# Patient Record
Sex: FEMALE | Race: WHITE
[De-identification: names, ages, dates, MRNs, and addresses within clinical notes are randomized per-mention and may not be internally consistent; named-entity substitution may affect disease eponyms.]

---

## 2017-05-04 ENCOUNTER — HOSPITAL ENCOUNTER (EMERGENCY)
Dept: HOSPITAL 5 - ED | Age: 37
LOS: 1 days | Discharge: HOME | End: 2017-05-05
Payer: SELF-PAY

## 2017-05-04 DIAGNOSIS — R07.9: ICD-10-CM

## 2017-05-04 DIAGNOSIS — F41.9: ICD-10-CM

## 2017-05-04 DIAGNOSIS — I10: Primary | ICD-10-CM

## 2017-05-04 LAB
ANION GAP SERPL CALC-SCNC: 17 MMOL/L
BASOPHILS NFR BLD AUTO: 0.2 % (ref 0–1.8)
BUN SERPL-MCNC: 8 MG/DL (ref 7–17)
BUN/CREAT SERPL: 8.88 %
CALCIUM SERPL-MCNC: 9.6 MG/DL (ref 8.4–10.2)
CHLORIDE SERPL-SCNC: 95.5 MMOL/L (ref 98–107)
CO2 SERPL-SCNC: 26 MMOL/L (ref 22–30)
EOSINOPHIL NFR BLD AUTO: 0.3 % (ref 0–4.3)
GLUCOSE SERPL-MCNC: 133 MG/DL (ref 65–100)
HCT VFR BLD CALC: 40.7 % (ref 30.3–42.9)
HGB BLD-MCNC: 13.6 GM/DL (ref 10.1–14.3)
MCH RBC QN AUTO: 33 PG (ref 28–32)
MCHC RBC AUTO-ENTMCNC: 33 % (ref 30–34)
MCV RBC AUTO: 98 FL (ref 79–97)
PLATELET # BLD: 349 K/MM3 (ref 140–440)
POTASSIUM SERPL-SCNC: 3.4 MMOL/L (ref 3.6–5)
RBC # BLD AUTO: 4.16 M/MM3 (ref 3.65–5.03)
SODIUM SERPL-SCNC: 135 MMOL/L (ref 137–145)
WBC # BLD AUTO: 12.4 K/MM3 (ref 4.5–11)

## 2017-05-04 PROCEDURE — 84443 ASSAY THYROID STIM HORMONE: CPT

## 2017-05-04 PROCEDURE — 85379 FIBRIN DEGRADATION QUANT: CPT

## 2017-05-04 PROCEDURE — 80048 BASIC METABOLIC PNL TOTAL CA: CPT

## 2017-05-04 PROCEDURE — 36415 COLL VENOUS BLD VENIPUNCTURE: CPT

## 2017-05-04 PROCEDURE — 99285 EMERGENCY DEPT VISIT HI MDM: CPT

## 2017-05-04 PROCEDURE — 93005 ELECTROCARDIOGRAM TRACING: CPT

## 2017-05-04 PROCEDURE — 71010: CPT

## 2017-05-04 PROCEDURE — 96361 HYDRATE IV INFUSION ADD-ON: CPT

## 2017-05-04 PROCEDURE — 85025 COMPLETE CBC W/AUTO DIFF WBC: CPT

## 2017-05-04 PROCEDURE — 84484 ASSAY OF TROPONIN QUANT: CPT

## 2017-05-04 PROCEDURE — 84703 CHORIONIC GONADOTROPIN ASSAY: CPT

## 2017-05-04 PROCEDURE — 93010 ELECTROCARDIOGRAM REPORT: CPT

## 2017-05-04 PROCEDURE — 96374 THER/PROPH/DIAG INJ IV PUSH: CPT

## 2017-05-04 NOTE — EMERGENCY DEPARTMENT REPORT
HPI





- General


Chief Complaint: Chest Pain


Time Seen by Provider: 05/04/17 22:39





- HPI


HPI: 


This is a 36-year-old  female presents to the emergency department by 

EMS from home with complaint of some midsternal chest pain and some shortness 

of breath that began earlier this morning.  The patient says that she has been 

having this problem over the past 3-4 weeks but normally it is just very small 

quick spells.  She denies any nausea, vomiting, fever, diaphoresis.  She does 

not have any past medical or surgical history.  She did not take anything for 

symptoms prior to presentation.  She did not receive anything in route by EMS.  

Recent travel or sick contacts at home.  She denies tobacco or drug abuse.  

Patient does have some anxiety and stress in her life right now as her mother 

is currently sick and in [place] with no one to take care of her and her 

grandmother is starting to deal with Alzheimer's dementia.








ED Past Medical Hx





- Past Medical History


Hx Hypertension: No


Hx Diabetes: No


Hx Deep Vein Thrombosis: No


Hx Renal Disease: No


Hx Sickle Cell Disease: No


Hx Seizures: No


Hx Asthma: No


Hx HIV: No





- Social History


Smoking Status: Never Smoker


Substance Use Type: None





ED Review of Systems


ROS: 


Stated complaint: ANXIETY/CHEST PAIN


Other details as noted in HPI





Comment: All other systems reviewed and negative


Constitutional: denies: chills, fever


Eyes: denies: eye pain, eye discharge, vision change


ENT: denies: ear pain, throat pain


Respiratory: shortness of breath.  denies: cough


Cardiovascular: chest pain.  denies: palpitations


Gastrointestinal: denies: abdominal pain, nausea, diarrhea


Genitourinary: denies: urgency, dysuria, discharge


Musculoskeletal: denies: back pain, joint swelling, arthralgia


Skin: denies: rash, lesions


Neurological: denies: headache, weakness, paresthesias


Psychiatric: anxiety.  denies: suicidal thoughts





Physical Exam





- Physical Exam


Vital Signs: 


 Vital Signs











  05/04/17 05/04/17 05/04/17





  16:58 22:29 22:31


 


Temperature 98.1 F 98.2 F 98.1 F


 


Pulse Rate 122 H 105 H 107 H


 


Respiratory 22 20 19





Rate   


 


Blood Pressure 154/111  152/111


 


Blood Pressure  152/111 





[Left]   


 


O2 Sat by Pulse 100 99 98





Oximetry   











Physical Exam: 


GENERAL: The patient is well-developed well-nourished.  Patient appears 

slightly anxious.


HEENT: Normocephalic.  Atraumatic.  Extraocular motions are intact.  Patient 

has moist mucous membranes.  Pupils equal reactive to light bilaterally.


NECK: Supple.  Trachea is midline.


CHEST/LUNGS: Clear to auscultation.  There is no respiratory distress noted.


HEART/CARDIOVASCULAR: Regular.  There is mild tachycardia.  There is no gallop 

rub or murmur.


ABDOMEN: Abdomen is soft, nontender.  Patient has normal bowel sounds.  There 

is no abdominal distention.


SKIN: Skin is warm and dry.


NEURO: The patient is awake, alert, and oriented.  The patient is cooperative.  

The patient has no focal neurologic deficits.  The patient has normal speech.


MUSCULOSKELETAL: There is no tenderness or deformity.  There is no limitation 

range of motion.  There is no evidence of acute injury.








ED Course


 Vital Signs











  05/04/17 05/04/17 05/04/17





  16:58 22:29 22:31


 


Temperature 98.1 F 98.2 F 98.1 F


 


Pulse Rate 122 H 105 H 107 H


 


Respiratory 22 20 19





Rate   


 


Blood Pressure 154/111  152/111


 


Blood Pressure  152/111 





[Left]   


 


O2 Sat by Pulse 100 99 98





Oximetry   














ED Medical Decision Making





- Lab Data


Result diagrams: 


 05/04/17 17:07





 05/04/17 17:07





- EKG Data


-: EKG Interpreted by Me


EKG shows normal: sinus rhythm, axis, intervals, QRS complexes (Q waves to the 

septal leads), ST-T waves


Rate: normal, tachycardia (121 bpm)





- EKG Data


When compared to previous EKG there are: previous EKG unavailable


Interpretation: other (sinus tachycardia, normal axis, Q waves to the septal 

leads but no ST elevation MI.)





- Radiology Data


Radiology results: image reviewed


interpreted by me: 


Chest x-ray did not show any acute process.  Heart is normal shape and size.  

No effusions.  No pneumothorax.  No signs of pneumonia seen.








- Medical Decision Making


36 year old female presents the emergency department with a complaint of some 

chest discomfort and occasional shortness of breath.  Patient presents with 

tachycardia and appears anxious.  EKG shows some sinus tachycardia but no signs 

of ST elevation MI or significant dysrhythmia.  Chest x-ray does not show any 

acute process.  Patient's labs have been mostly unremarkable with negative 

troponins 3, negative d-dimer, normal thyroid function and no signs of 

infection.  Patient was given some IV fluid and Toradol for discomfort.  Upon 

reevaluation the patient says she is feeling greatly improved and all of her 

symptoms have resolved.  Her vital signs were stable throughout ED course but 

the tachycardia resolved.  The patient has no past medical history and is low 

on the RON score.  She is low on the heart score.  She appears safe for 

discharge home at this time.  She was given referrals for primary care and will 

return to the ER with any worsening of her symptoms or any acute distress.








- Differential Diagnosis


MI, PE, hyperthyroidism, anxiety


Critical Care Time: No


Critical care attestation.: 


If time is entered above; I have spent that time in minutes in the direct care 

of this critically ill patient, excluding procedure time.








ED Disposition


Clinical Impression: 


 Anxiety





Chest pain


Qualifiers:


 Chest pain type: unspecified Qualified Code(s): R07.9 - Chest pain, unspecified





Hypertension


Qualifiers:


 Hypertension type: essential hypertension Qualified Code(s): I10 - Essential (

primary) hypertension





Disposition: DISCHARGED TO HOME OR SELFCARE


Is pt being admited?: No


Condition: Stable


Instructions:  Chest Pain (ED), Hypertension (ED)


Additional Instructions: 


Please follow-up with a primary care doctor as soon as possible.  Return to the 

emergency department with any worsening of your symptoms or any acute distress.


Referrals: 


PRIMARY CARE,MD [Primary Care Provider] - 3-5 Days


[facility] [Outside] - 3-5 Days


[facility] [Outside] - 3-5 Days


[facility] [Outside] - 3-5 Days


Forms:  Work/School Release Form(ED)


Time of Disposition: 00:45

## 2017-05-05 VITALS — SYSTOLIC BLOOD PRESSURE: 148 MMHG | DIASTOLIC BLOOD PRESSURE: 97 MMHG

## 2017-05-05 NOTE — XRAY REPORT
ROUTINE CHEST, TWO VIEWS:



Chest pain.

PA and lateral views demonstrate the heart and mediastinal

contour to be of normal size and shape.  The lungs are clear and fully 

expanded and the soft tissues and bony structures are normal.



IMPRESSION:

Normal study.

## 2020-11-12 ENCOUNTER — HOSPITAL ENCOUNTER (INPATIENT)
Dept: HOSPITAL 5 - LD | Age: 40
LOS: 4 days | Discharge: HOME | End: 2020-11-16
Attending: OBSTETRICS & GYNECOLOGY | Admitting: OBSTETRICS & GYNECOLOGY
Payer: COMMERCIAL

## 2020-11-12 DIAGNOSIS — Z20.828: ICD-10-CM

## 2020-11-12 DIAGNOSIS — Z12.4: ICD-10-CM

## 2020-11-12 DIAGNOSIS — Z3A.40: ICD-10-CM

## 2020-11-12 DIAGNOSIS — R03.0: ICD-10-CM

## 2020-11-12 LAB
ALBUMIN SERPL-MCNC: 3.2 G/DL (ref 3.9–5)
ALT SERPL-CCNC: 7 UNITS/L (ref 7–56)
BUN SERPL-MCNC: 6 MG/DL (ref 7–17)
BUN/CREAT SERPL: 10 %
CALCIUM SERPL-MCNC: 8.8 MG/DL (ref 8.4–10.2)
HCT VFR BLD CALC: 30.1 % (ref 30.3–42.9)
HEMOLYSIS INDEX: 4
HGB BLD-MCNC: 10.4 GM/DL (ref 10.1–14.3)
MCHC RBC AUTO-ENTMCNC: 35 % (ref 30–34)
MCV RBC AUTO: 96 FL (ref 79–97)
PLATELET # BLD: 281 K/MM3 (ref 140–440)
RBC # BLD AUTO: 3.12 M/MM3 (ref 3.65–5.03)
URATE SERPL-MCNC: 5.1 MG/DL (ref 3.5–7.6)

## 2020-11-12 PROCEDURE — 36415 COLL VENOUS BLD VENIPUNCTURE: CPT

## 2020-11-12 PROCEDURE — 85014 HEMATOCRIT: CPT

## 2020-11-12 PROCEDURE — 86850 RBC ANTIBODY SCREEN: CPT

## 2020-11-12 PROCEDURE — 87086 URINE CULTURE/COLONY COUNT: CPT

## 2020-11-12 PROCEDURE — 82962 GLUCOSE BLOOD TEST: CPT

## 2020-11-12 PROCEDURE — 80053 COMPREHEN METABOLIC PANEL: CPT

## 2020-11-12 PROCEDURE — 83615 LACTATE (LD) (LDH) ENZYME: CPT

## 2020-11-12 PROCEDURE — 86900 BLOOD TYPING SEROLOGIC ABO: CPT

## 2020-11-12 PROCEDURE — 85018 HEMOGLOBIN: CPT

## 2020-11-12 PROCEDURE — 85025 COMPLETE CBC W/AUTO DIFF WBC: CPT

## 2020-11-12 PROCEDURE — U0003 INFECTIOUS AGENT DETECTION BY NUCLEIC ACID (DNA OR RNA); SEVERE ACUTE RESPIRATORY SYNDROME CORONAVIRUS 2 (SARS-COV-2) (CORONAVIRUS DISEASE [COVID-19]), AMPLIFIED PROBE TECHNIQUE, MAKING USE OF HIGH THROUGHPUT TECHNOLOGIES AS DESCRIBED BY CMS-2020-01-R: HCPCS

## 2020-11-12 PROCEDURE — 84550 ASSAY OF BLOOD/URIC ACID: CPT

## 2020-11-12 PROCEDURE — 86901 BLOOD TYPING SEROLOGIC RH(D): CPT

## 2020-11-12 PROCEDURE — 81001 URINALYSIS AUTO W/SCOPE: CPT

## 2020-11-12 PROCEDURE — 85027 COMPLETE CBC AUTOMATED: CPT

## 2020-11-12 RX ADMIN — SODIUM CHLORIDE, SODIUM LACTATE, POTASSIUM CHLORIDE, AND CALCIUM CHLORIDE SCH MLS/HR: .6; .31; .03; .02 INJECTION, SOLUTION INTRAVENOUS at 11:10

## 2020-11-12 RX ADMIN — SODIUM CHLORIDE, SODIUM LACTATE, POTASSIUM CHLORIDE, AND CALCIUM CHLORIDE SCH MLS/HR: .6; .31; .03; .02 INJECTION, SOLUTION INTRAVENOUS at 17:58

## 2020-11-12 RX ADMIN — SODIUM CHLORIDE, SODIUM LACTATE, POTASSIUM CHLORIDE, AND CALCIUM CHLORIDE SCH MLS/HR: .6; .31; .03; .02 INJECTION, SOLUTION INTRAVENOUS at 23:43

## 2020-11-12 NOTE — HISTORY AND PHYSICAL REPORT
History of Present Illness


Date of examination: 20


Date of admission: 


20 07:21





Chief complaint: 





"I think my water broke"


History of present illness: 





39 y/o   female presents to [facility] @ 40.1 wks with SROM cl fluid @ 5am

today. She initiated her pnc @ [facility] at 26.6 wks. Her preg has been 

complicated by late care, suspected fetal microcephaly, and GDM (diet 

controlled). She is AMA, rubella NI, pap with LSIL and GBS neg. Pt was admitted 

to L&D for .





Past History


Past Medical History: no pertinent history


Past Surgical History: no surgical history


GYN History: abnormal PAP smear


Family/Genetic History: diabetes, other (ANXIETY)


Social history: no significant social history





- Obstetrical History


Expected Date of Delivery: 20


Actual Gestation: 40 Week(s) 1 Day(s) 


: 6


Para: 5


Hx # Term Pregnancies: 5


Number of  Pregnancies: 0


Spontaneous Abortions: 0


Induced : 0


Number of Living Children: 5





Medications and Allergies


                                    Allergies











Allergy/AdvReac Type Severity Reaction Status Date / Time


 


No Known Allergies Allergy   Verified 01/23/15 09:33











                                Home Medications











 Medication  Instructions  Recorded  Confirmed  Last Taken  Type


 


Prenatal Vitamin 1 tab PO DAILY 20 20:00 History














Review of Systems


All systems: negative


Eyes: deferred


Ears, nose, mouth and throat: deferred


Breasts: normal


Genitourinary: normal appearance


Rectal Exam: deferred





- Vital Signs


Vital signs: 


                                   Vital Signs











Pulse Pulse Ox


 


 86   97 


 


 20 07:34  20 07:34








                                        











Temp Pulse Resp BP Pulse Ox


 


 98.5 F   73   17   131/92   96 


 


 20 08:29  20 09:36  20 08:29  20 09:36  20 08:50














- Physical Exam


Breasts: Positive: normal


Abdomen: Positive: normal appearance, soft, other (GRAVID)


Genitourinary (Female): Positive: normal external genitalia, normal perenium


Vulva: both: normal


Vagina: Positive: normal moisture


Uterus: Positive: enlarged, other (GRAVID)


Adnexa: both: normal


Anus/Rectum: Positive: normal perianal skin


Extremities: Positive: normal





- Obstetrical


FHR: auscultation normal, category 1


Uterine Contraction Monitor Mode: External


Cervical Dilatation: 3 (PER NURSE)


Cervical Effacement Percentage: 50 (PER NURSE)


Fetal station: -3


Uterine Contraction Pattern: Irregular


Uterine Tone Measurement Phase: Resting


Uterine Contraction Intensity: Mild





Results


All other labs normal.








Assessment and Plan





A: IUP@ 40.1 wks with srom


    AMA,Rubella NI, GDM


    Suspected fetal microcephaly





p: Admit to L&D


    Start Pitocin per protocal


    FSBS AC and HS


    Continue monitoring


    Anticipate 


    Notify NICU of fetal status


    Offer Rubella vaccine pp





- Patient Problems


(1) Term pregnancy


Current Visit: Yes   Status: Acute   





(2) AMA (advanced maternal age) multigravida 35+


Current Visit: Yes   Status: Acute   





(3) Fetal microcephaly


Current Visit: Yes   Status: Acute   





(4) GDM (gestational diabetes mellitus)


Current Visit: Yes   Status: Acute   





(5) Abnormal Pap smear of cervix


Current Visit: Yes   Status: Acute

## 2020-11-12 NOTE — ANESTHESIA CONSULTATION
Anesthesia Consult and Med Hx


Date of service: 11/12/20





- Airway


Anesthetic Teeth Evaluation: Good


ROM Head & Neck: Adequate


Mental/Hyoid Distance: Adequate


Mallampati Class: Class II


Intubation Access Assessment: Probably Good





- Pulmonary Exam


CTA: Yes





- Cardiac Exam


Cardiac Exam: RRR





- Pre-Operative Health Status


ASA Pre-Surgery Classification: ASA3


Proposed Anesthetic Plan: Epidural





- Pulmonary


Hx Asthma: No


Hx Pneumonia: No





- Cardiovascular System


Hx Hypertension: No





- Central Nervous System


Hx Seizures: No


Hx Psychiatric Problems: No





- Endocrine


Hx Renal Disease: No


Hx End Stage Renal Disease: No


Hx Non-Insulin Dependent Diabetes: Yes


Hx Hypothyroidism: No


Hx Hyperthyroidism: No





- Hematic


Hx Anemia: No


Hx Sickle Cell Disease: No





- Other Systems


Hx Alcohol Use: No

## 2020-11-13 LAB
HCT VFR BLD CALC: 27.3 % (ref 30.3–42.9)
HGB BLD-MCNC: 9.5 GM/DL (ref 10.1–14.3)

## 2020-11-13 PROCEDURE — 3E0R3BZ INTRODUCTION OF ANESTHETIC AGENT INTO SPINAL CANAL, PERCUTANEOUS APPROACH: ICD-10-PCS | Performed by: OBSTETRICS & GYNECOLOGY

## 2020-11-13 PROCEDURE — 00HU33Z INSERTION OF INFUSION DEVICE INTO SPINAL CANAL, PERCUTANEOUS APPROACH: ICD-10-PCS | Performed by: OBSTETRICS & GYNECOLOGY

## 2020-11-13 PROCEDURE — 0HQ9XZZ REPAIR PERINEUM SKIN, EXTERNAL APPROACH: ICD-10-PCS | Performed by: OBSTETRICS & GYNECOLOGY

## 2020-11-13 PROCEDURE — 10H07YZ INSERTION OF OTHER DEVICE INTO PRODUCTS OF CONCEPTION, VIA NATURAL OR ARTIFICIAL OPENING: ICD-10-PCS | Performed by: OBSTETRICS & GYNECOLOGY

## 2020-11-13 RX ADMIN — IBUPROFEN SCH MG: 600 TABLET, FILM COATED ORAL at 17:45

## 2020-11-13 RX ADMIN — Medication SCH EACH: at 12:27

## 2020-11-13 RX ADMIN — IBUPROFEN SCH MG: 600 TABLET, FILM COATED ORAL at 03:25

## 2020-11-13 RX ADMIN — IBUPROFEN SCH MG: 600 TABLET, FILM COATED ORAL at 12:27

## 2020-11-13 NOTE — PROCEDURE NOTE
OB Delivery Note





- Vaginal


Delivery presentation: vertex


Delivery position: OA


Intrapartum events: mult.variable deceleratio


Delivery induction: none


Delivery augmentation: pitocin


Delivery monitor: external FHT, external uterine, internal FHT, internal uterine


Route of delivery: 


Delivery placenta: spontaneous


Delivery cord: 3 umbilical vessels


Episiotomy: none


Delivery laceration: 1st degree, other (perineal)


Delivery repair: vicryl


Anesthesia: local, epidural


Delivery comments: 





Called to  for delivery. SVE 10/100%/+1 and pushing begin.  of a viable 

live female infant in OA position. Spontaneous delivery of head and shoulders 

with terminal mec noted. Infant was immed placed on mother's chest for skin to 

skin bonding while nurse stimulated and dried baby. Delayed cord clamping x 90 

sec then cord was clamped x 2 and baby's sister was allowed to cut the cord. 

Afterwards, infant was given to awaiting NICU nurse for an asses. APGAR 8/9. 

Spontaneous delivery of an intact placenta with CVX 3. FF@ U1 with fundal 

massage and IV Pitocin. Exploration of tears revealed a 1st degree perineal lac 

which was repaired with a 3-0 vicryl on a CT-1. EBL 50 cc. Mom and baby stable. 

FW [id number] Gms.





- Infant


  ** A


Apgar at 1 minute: 8


Apgar at 5 minutes: 9


Infant Gender: Female (FW [id number])

## 2020-11-14 RX ADMIN — IBUPROFEN SCH MG: 600 TABLET, FILM COATED ORAL at 17:20

## 2020-11-14 RX ADMIN — Medication SCH: at 17:18

## 2020-11-14 RX ADMIN — IBUPROFEN SCH MG: 600 TABLET, FILM COATED ORAL at 01:36

## 2020-11-14 RX ADMIN — IBUPROFEN SCH: 600 TABLET, FILM COATED ORAL at 00:56

## 2020-11-14 RX ADMIN — IBUPROFEN SCH: 600 TABLET, FILM COATED ORAL at 12:00

## 2020-11-15 LAB
ALBUMIN SERPL-MCNC: 3.1 G/DL (ref 3.9–5)
ALT SERPL-CCNC: 10 UNITS/L (ref 7–56)
BACTERIA #/AREA URNS HPF: (no result) /HPF
BASOPHILS # (AUTO): 0 K/MM3 (ref 0–0.1)
BASOPHILS NFR BLD AUTO: 0.1 % (ref 0–1.8)
BILIRUB UR QL STRIP: (no result)
BLOOD UR QL VISUAL: (no result)
BUN SERPL-MCNC: 9 MG/DL (ref 7–17)
BUN/CREAT SERPL: 15 %
CALCIUM SERPL-MCNC: 9 MG/DL (ref 8.4–10.2)
EOSINOPHIL # BLD AUTO: 0.1 K/MM3 (ref 0–0.4)
EOSINOPHIL NFR BLD AUTO: 1.1 % (ref 0–4.3)
HCT VFR BLD CALC: 30.6 % (ref 30.3–42.9)
HEMOLYSIS INDEX: 8
HGB BLD-MCNC: 10.9 GM/DL (ref 10.1–14.3)
LYMPHOCYTES # BLD AUTO: 0.6 K/MM3 (ref 1.2–5.4)
LYMPHOCYTES NFR BLD AUTO: 6.2 % (ref 13.4–35)
MCHC RBC AUTO-ENTMCNC: 36 % (ref 30–34)
MCV RBC AUTO: 97 FL (ref 79–97)
MONOCYTES # (AUTO): 0.5 K/MM3 (ref 0–0.8)
MONOCYTES % (AUTO): 4.7 % (ref 0–7.3)
MUCOUS THREADS #/AREA URNS HPF: (no result) /HPF
PH UR STRIP: 5 [PH] (ref 5–7)
PLATELET # BLD: 289 K/MM3 (ref 140–440)
RBC # BLD AUTO: 3.16 M/MM3 (ref 3.65–5.03)
RBC #/AREA URNS HPF: > 182 /HPF (ref 0–6)
URATE SERPL-MCNC: 4.2 MG/DL (ref 3.5–7.6)
UROBILINOGEN UR-MCNC: < 2 MG/DL (ref ?–2)
WBC #/AREA URNS HPF: > 182 /HPF (ref 0–6)

## 2020-11-15 RX ADMIN — IBUPROFEN SCH MG: 600 TABLET, FILM COATED ORAL at 18:00

## 2020-11-15 RX ADMIN — IBUPROFEN SCH MG: 600 TABLET, FILM COATED ORAL at 12:39

## 2020-11-15 RX ADMIN — FERROUS SULFATE TAB 325 MG (65 MG ELEMENTAL FE) SCH MG: 325 (65 FE) TAB at 09:50

## 2020-11-15 RX ADMIN — FERROUS SULFATE TAB 325 MG (65 MG ELEMENTAL FE) SCH MG: 325 (65 FE) TAB at 23:15

## 2020-11-15 RX ADMIN — Medication SCH EACH: at 09:50

## 2020-11-15 RX ADMIN — IBUPROFEN SCH MG: 600 TABLET, FILM COATED ORAL at 05:16

## 2020-11-15 RX ADMIN — FERROUS SULFATE TAB 325 MG (65 MG ELEMENTAL FE) SCH MG: 325 (65 FE) TAB at 00:46

## 2020-11-15 RX ADMIN — IBUPROFEN SCH MG: 600 TABLET, FILM COATED ORAL at 00:44

## 2020-11-16 VITALS — DIASTOLIC BLOOD PRESSURE: 73 MMHG | SYSTOLIC BLOOD PRESSURE: 131 MMHG

## 2020-11-16 RX ADMIN — IBUPROFEN SCH: 600 TABLET, FILM COATED ORAL at 04:34

## 2020-11-16 RX ADMIN — Medication SCH EACH: at 09:47

## 2020-11-16 RX ADMIN — FERROUS SULFATE TAB 325 MG (65 MG ELEMENTAL FE) SCH MG: 325 (65 FE) TAB at 09:47

## 2020-11-16 RX ADMIN — IBUPROFEN SCH: 600 TABLET, FILM COATED ORAL at 06:25

## 2020-11-16 NOTE — PROGRESS NOTE
Assessment and Plan





A: IUP @ 40.1 wks with srom cl fluid


    GDM





P: Continue monitoring with Pitocin


    Pain med/ Epidural prn


    Anticipate 








- Patient Problems


(1) Term pregnancy


Current Visit: Yes   Status: Acute   





(2) AMA (advanced maternal age) multigravida 35+


Current Visit: Yes   Status: Acute   





(3) Fetal microcephaly


Current Visit: Yes   Status: Acute   





(4) GDM (gestational diabetes mellitus)


Current Visit: Yes   Status: Acute   





(5) Abnormal Pap smear of cervix


Current Visit: Yes   Status: Acute   





Subjective





- Subjective


Date of service: 20


Principal diagnosis: IUP@ TERM


Interval history: 





41 y/o   female presents to [facility] @ 40.1 wks with SROM cl fluid @ 5am

today. She initiated her pnc @ [facility] at 26.6 wks. Her preg has been 

complicated by late care, suspected fetal microcephaly, and GDM (diet 

controlled). She is AMA, rubella NI, pap with LSIL and GBS neg. Pt was admitted 

to L&D for .


Patient reports: fetal movement normal





Objective





- Vital Signs


Vital Signs: 


                               Vital Signs - 12hr











  20





  07:54 07:59 08:04


 


Temperature   


 


Pulse Rate 85 88 83


 


Respiratory   





Rate   


 


Blood Pressure   


 


Blood Pressure   





[Right]   


 


O2 Sat by Pulse 97 97 97





Oximetry   














  20





  08:29 08:30 08:31


 


Temperature 98.5 F  


 


Pulse Rate 83 77 81


 


Respiratory 17  





Rate   


 


Blood Pressure   136/94


 


Blood Pressure 136/94  





[Right]   


 


O2 Sat by Pulse 97 97 





Oximetry   














  20





  08:35 08:40 08:42


 


Temperature   


 


Pulse Rate 85 89 78


 


Respiratory   





Rate   


 


Blood Pressure   


 


Blood Pressure   





[Right]   


 


O2 Sat by Pulse 100 96 93





Oximetry   














  20





  08:45 08:50 08:52


 


Temperature   


 


Pulse Rate 83 85 81


 


Respiratory   





Rate   


 


Blood Pressure   154/92


 


Blood Pressure   





[Right]   


 


O2 Sat by Pulse 93 96 





Oximetry   














  20





  09:06 09:21 09:36


 


Temperature   


 


Pulse Rate 79 85 73


 


Respiratory   





Rate   


 


Blood Pressure 129/72 137/92 131/92


 


Blood Pressure   





[Right]   


 


O2 Sat by Pulse   





Oximetry   














  20





  09:51 10:05 10:22


 


Temperature   


 


Pulse Rate 75 84 80


 


Respiratory   





Rate   


 


Blood Pressure 137/83 132/93 138/73


 


Blood Pressure   





[Right]   


 


O2 Sat by Pulse   





Oximetry   














  20





  11:19 11:20 11:24


 


Temperature  98.2 F 


 


Pulse Rate 86 86 79


 


Respiratory  16 





Rate   


 


Blood Pressure 119/75  


 


Blood Pressure  119/75 





[Right]   


 


O2 Sat by Pulse 96 98 97





Oximetry   














  20





  11:29 11:34 11:39


 


Temperature   


 


Pulse Rate 81 86 77


 


Respiratory   





Rate   


 


Blood Pressure   


 


Blood Pressure   





[Right]   


 


O2 Sat by Pulse 97 98 96





Oximetry   














  20





  11:44 11:49 11:54


 


Temperature   


 


Pulse Rate 81 75 79


 


Respiratory   





Rate   


 


Blood Pressure   


 


Blood Pressure   





[Right]   


 


O2 Sat by Pulse 97 97 96





Oximetry   














  20





  11:59 12:01 12:04


 


Temperature   


 


Pulse Rate 83 81 79


 


Respiratory   





Rate   


 


Blood Pressure  134/82 


 


Blood Pressure   





[Right]   


 


O2 Sat by Pulse 98  97





Oximetry   














  20





  12:09 12:14 12:19


 


Temperature   


 


Pulse Rate 83 76 80


 


Respiratory   





Rate   


 


Blood Pressure   


 


Blood Pressure   





[Right]   


 


O2 Sat by Pulse 98 97 97





Oximetry   














  20





  12:24 12:30 12:35


 


Temperature   


 


Pulse Rate 90 81 86


 


Respiratory   





Rate   


 


Blood Pressure   


 


Blood Pressure   





[Right]   


 


O2 Sat by Pulse 98 94 97





Oximetry   














  20





  12:40 12:45 12:50


 


Temperature   


 


Pulse Rate 79 79 77


 


Respiratory   





Rate   


 


Blood Pressure   


 


Blood Pressure   





[Right]   


 


O2 Sat by Pulse 96 95 95





Oximetry   














  20





  12:54 12:55 13:00


 


Temperature   


 


Pulse Rate 76 79 71


 


Respiratory   





Rate   


 


Blood Pressure   


 


Blood Pressure   





[Right]   


 


O2 Sat by Pulse 94 95 96





Oximetry   














  20





  13:01 13:05 13:10


 


Temperature   


 


Pulse Rate 74 74 74


 


Respiratory   





Rate   


 


Blood Pressure 126/74  


 


Blood Pressure   





[Right]   


 


O2 Sat by Pulse  96 95





Oximetry   














  20





  13:15 13:20 13:25


 


Temperature   


 


Pulse Rate 70 88 78


 


Respiratory   





Rate   


 


Blood Pressure   


 


Blood Pressure   





[Right]   


 


O2 Sat by Pulse 96 97 96





Oximetry   














  20





  13:30 13:32 13:35


 


Temperature   


 


Pulse Rate 74 78 79


 


Respiratory   





Rate   


 


Blood Pressure   


 


Blood Pressure   





[Right]   


 


O2 Sat by Pulse 97 94 96





Oximetry   














  20





  13:40 13:45 13:50


 


Temperature   


 


Pulse Rate 75 74 74


 


Respiratory   





Rate   


 


Blood Pressure   


 


Blood Pressure   





[Right]   


 


O2 Sat by Pulse 98 97 98





Oximetry   














  20





  13:55 14:00 14:01


 


Temperature   


 


Pulse Rate 86 74 75


 


Respiratory   





Rate   


 


Blood Pressure   125/77


 


Blood Pressure   





[Right]   


 


O2 Sat by Pulse 97 97 





Oximetry   














  20





  14:05 14:10 14:15


 


Temperature   


 


Pulse Rate 77 75 72


 


Respiratory   





Rate   


 


Blood Pressure   


 


Blood Pressure   





[Right]   


 


O2 Sat by Pulse 97 98 97





Oximetry   














  20





  14:20 14:22 14:25


 


Temperature   


 


Pulse Rate 75 107 H 76


 


Respiratory   





Rate   


 


Blood Pressure   


 


Blood Pressure   





[Right]   


 


O2 Sat by Pulse 96 86 97





Oximetry   














  20





  14:30 14:35 14:39


 


Temperature   


 


Pulse Rate 72 74 25 L


 


Respiratory   





Rate   


 


Blood Pressure   


 


Blood Pressure   





[Right]   


 


O2 Sat by Pulse 96 97 85





Oximetry   














  20





  14:40 14:45 14:50


 


Temperature   


 


Pulse Rate 86 75 77


 


Respiratory   





Rate   


 


Blood Pressure   


 


Blood Pressure   





[Right]   


 


O2 Sat by Pulse 98 97 97





Oximetry   














  20





  14:55 14:59 15:00


 


Temperature   


 


Pulse Rate 76 77 74


 


Respiratory   





Rate   


 


Blood Pressure   


 


Blood Pressure   





[Right]   


 


O2 Sat by Pulse 97 91 97





Oximetry   














  20





  15:01 15:05 15:10


 


Temperature   


 


Pulse Rate 66 73 78


 


Respiratory   





Rate   


 


Blood Pressure 151/88  


 


Blood Pressure   





[Right]   


 


O2 Sat by Pulse  93 92





Oximetry   














  20





  15:15 15:20 15:25


 


Temperature   


 


Pulse Rate 72 71 79


 


Respiratory   





Rate   


 


Blood Pressure   


 


Blood Pressure   





[Right]   


 


O2 Sat by Pulse 97 87 96





Oximetry   














  20





  15:30 15:35 15:40


 


Temperature   


 


Pulse Rate 77 73 76


 


Respiratory   





Rate   


 


Blood Pressure   


 


Blood Pressure   





[Right]   


 


O2 Sat by Pulse 98 98 97





Oximetry   














  20





  15:45 15:50 15:59


 


Temperature   


 


Pulse Rate 75 81 81


 


Respiratory   





Rate   


 


Blood Pressure   


 


Blood Pressure   





[Right]   


 


O2 Sat by Pulse 97 96 96





Oximetry   














  20





  16:01 16:04 16:09


 


Temperature   


 


Pulse Rate 71 81 72


 


Respiratory   





Rate   


 


Blood Pressure 140/78  


 


Blood Pressure   





[Right]   


 


O2 Sat by Pulse  96 96





Oximetry   














  20





  16:14 16:19 16:24


 


Temperature   


 


Pulse Rate 79 70 79


 


Respiratory   





Rate   


 


Blood Pressure   


 


Blood Pressure   





[Right]   


 


O2 Sat by Pulse 97 97 97





Oximetry   














  20





  16:29 16:34 16:39


 


Temperature   


 


Pulse Rate 77 75 85


 


Respiratory   





Rate   


 


Blood Pressure   


 


Blood Pressure   





[Right]   


 


O2 Sat by Pulse 97 98 98





Oximetry   














  20





  16:44 16:49 16:54


 


Temperature   


 


Pulse Rate 80 89 83


 


Respiratory   





Rate   


 


Blood Pressure   


 


Blood Pressure   





[Right]   


 


O2 Sat by Pulse 97 98 96





Oximetry   














  20





  17:22 17:23 17:27


 


Temperature   


 


Pulse Rate 84 87 100 H


 


Respiratory   





Rate   


 


Blood Pressure  154/88 


 


Blood Pressure   





[Right]   


 


O2 Sat by Pulse 98  97





Oximetry   














  20





  17:32 17:37 17:42


 


Temperature   


 


Pulse Rate 80 89 82


 


Respiratory   





Rate   


 


Blood Pressure   


 


Blood Pressure   





[Right]   


 


O2 Sat by Pulse 96 99 99





Oximetry   














  20





  17:46 17:47 17:52


 


Temperature   


 


Pulse Rate 76 90 85


 


Respiratory   





Rate   


 


Blood Pressure   


 


Blood Pressure   





[Right]   


 


O2 Sat by Pulse 85 99 97





Oximetry   














  20





  17:57 18:00 18:02


 


Temperature   


 


Pulse Rate 84 83 88


 


Respiratory   





Rate   


 


Blood Pressure   142/79


 


Blood Pressure   





[Right]   


 


O2 Sat by Pulse 97 94 98





Oximetry   














  20





  18:09 18:14 18:19


 


Temperature   


 


Pulse Rate 87 81 81


 


Respiratory   





Rate   


 


Blood Pressure   


 


Blood Pressure   





[Right]   


 


O2 Sat by Pulse 99 98 98





Oximetry   














  20





  18:24 18:29 18:34


 


Temperature   


 


Pulse Rate 81 84 80


 


Respiratory   





Rate   


 


Blood Pressure   


 


Blood Pressure   





[Right]   


 


O2 Sat by Pulse 100 100 100





Oximetry   














  20





  18:39 18:44 18:49


 


Temperature   


 


Pulse Rate 82 84 86


 


Respiratory   





Rate   


 


Blood Pressure   


 


Blood Pressure   





[Right]   


 


O2 Sat by Pulse 100 100 100





Oximetry   














  20





  18:54 18:59 19:01


 


Temperature   


 


Pulse Rate 88 90 86


 


Respiratory   





Rate   


 


Blood Pressure   134/65


 


Blood Pressure   





[Right]   


 


O2 Sat by Pulse 100 100 





Oximetry   














  20





  19:03 19:08 19:13


 


Temperature   


 


Pulse Rate 88 102 H 98 H


 


Respiratory   





Rate   


 


Blood Pressure   


 


Blood Pressure   





[Right]   


 


O2 Sat by Pulse 84 98 98





Oximetry   














  20





  19:18 19:23 19:28


 


Temperature   


 


Pulse Rate 92 H 116 H 114 H


 


Respiratory   





Rate   


 


Blood Pressure   


 


Blood Pressure   





[Right]   


 


O2 Sat by Pulse 100 98 99





Oximetry   














  20





  19:30 19:33 19:34


 


Temperature   


 


Pulse Rate 121 H 129 H 126 H


 


Respiratory   





Rate   


 


Blood Pressure 160/91 177/101 176/100


 


Blood Pressure   





[Right]   


 


O2 Sat by Pulse  99 





Oximetry   














  20





  19:35 19:37 19:38


 


Temperature   


 


Pulse Rate 114 H 111 H 106 H


 


Respiratory   





Rate   


 


Blood Pressure 173/88 164/81 


 


Blood Pressure   





[Right]   


 


O2 Sat by Pulse   98





Oximetry   














  20





  19:40 19:43 19:45


 


Temperature   


 


Pulse Rate 98 H 98 H 100 H


 


Respiratory   





Rate   


 


Blood Pressure 163/81 133/76 134/79


 


Blood Pressure   





[Right]   


 


O2 Sat by Pulse  98 





Oximetry   














  20





  19:47 19:48


 


Temperature  


 


Pulse Rate 96 H 93 H


 


Respiratory  





Rate  


 


Blood Pressure 118/68 113/65


 


Blood Pressure  





[Right]  


 


O2 Sat by Pulse  98





Oximetry  














- Exam


Breasts: deferred


Abdomen: Present: normal appearance, soft


Vulva: both: normal


Uterus: Present: normal


FHR: auscultation normal, category 1


Uterine Contraction Monitor Mode: External


Cervical Dilatation: 4


Cervical Effacement Percentage: 90


Fetal station: -3


Uterine Contraction Pattern: Irregular


Uterine Tone Measurement Phase: Resting


Uterine Contraction Intensity: Moderate


Extremities: normal





- Labs


Labs: 


                                  Abnormal Labs











  20





  10:30 10:30


 


RBC  3.12 L 


 


Hct  30.1 L 


 


MCH  34 H 


 


MCHC  35 H 


 


Sodium   134 L


 


BUN   6 L


 


Alkaline Phosphatase   134 H


 


Albumin   3.2 L








                         Laboratory Results - last 24 hr











  20





  08:00 10:30 10:30


 


WBC   8.4 


 


RBC   3.12 L 


 


Hgb   10.4 


 


Hct   30.1 L 


 


MCV   96 


 


MCH   34 H 


 


MCHC   35 H 


 


RDW   13.9 


 


Plt Count   281 


 


Sodium    134 L


 


Potassium    3.6


 


Chloride    101.0


 


Carbon Dioxide    22


 


Anion Gap    15


 


BUN    6 L


 


Creatinine    0.6


 


Estimated GFR    > 60


 


BUN/Creatinine Ratio    10


 


Glucose    95


 


Uric Acid    5.1


 


Calcium    8.8


 


Total Bilirubin    0.20


 


AST    12


 


ALT    7


 


Alkaline Phosphatase    134 H


 


Total Protein    6.6


 


Albumin    3.2 L


 


Albumin/Globulin Ratio    0.9


 


Blood Type  O POSITIVE  


 


Antibody Screen  Negative
Assessment and Plan





A: IUP@ 40.1 wks


    GDM


    CAT II FHT





p: O2 via FM


    Position changes


    IUPC placed; will start an amniofusion


    Dr Orellana notified


    Continue monitoring


    Anticipate 





- Patient Problems


(1) Term pregnancy


Current Visit: Yes   Status: Acute   





(2) AMA (advanced maternal age) multigravida 35+


Current Visit: Yes   Status: Acute   





(3) Fetal microcephaly


Current Visit: Yes   Status: Acute   





(4) GDM (gestational diabetes mellitus)


Current Visit: Yes   Status: Acute   





(5) Abnormal Pap smear of cervix


Current Visit: Yes   Status: Acute   





Subjective





- Subjective


Date of service: 20


Principal diagnosis: IUP@ TERM


Interval history: 





41 y/o   female presents to [facility] @ 40.1 wks with SROM cl fluid @ 5am

today. She initiated her pnc @ [facility] at 26.6 wks. Her preg has been 

complicated by late care, suspected fetal microcephaly, and GDM (diet 

controlled). She is AMA, rubella NI, pap with LSIL and GBS neg. Pt was admitted 

to L&D for .


Patient reports: fetal movement normal





Objective





- Vital Signs


Vital Signs: 


                               Vital Signs - 12hr











  20





  09:36 09:51 10:05


 


Temperature   


 


Pulse Rate 73 75 84


 


Respiratory   





Rate   


 


Blood Pressure 131/92 137/83 132/93


 


Blood Pressure   





[Right]   


 


O2 Sat by Pulse   





Oximetry   














  20





  10:22 11:19 11:20


 


Temperature   98.2 F


 


Pulse Rate 80 86 86


 


Respiratory   16





Rate   


 


Blood Pressure 138/73 119/75 


 


Blood Pressure   119/75





[Right]   


 


O2 Sat by Pulse  96 98





Oximetry   














  20





  11:24 11:29 11:34


 


Temperature   


 


Pulse Rate 79 81 86


 


Respiratory   





Rate   


 


Blood Pressure   


 


Blood Pressure   





[Right]   


 


O2 Sat by Pulse 97 97 98





Oximetry   














  20





  11:39 11:44 11:49


 


Temperature   


 


Pulse Rate 77 81 75


 


Respiratory   





Rate   


 


Blood Pressure   


 


Blood Pressure   





[Right]   


 


O2 Sat by Pulse 96 97 97





Oximetry   














  20





  11:54 11:59 12:01


 


Temperature   


 


Pulse Rate 79 83 81


 


Respiratory   





Rate   


 


Blood Pressure   134/82


 


Blood Pressure   





[Right]   


 


O2 Sat by Pulse 96 98 





Oximetry   














  20





  12:04 12:09 12:14


 


Temperature   


 


Pulse Rate 79 83 76


 


Respiratory   





Rate   


 


Blood Pressure   


 


Blood Pressure   





[Right]   


 


O2 Sat by Pulse 97 98 97





Oximetry   














  20





  12:19 12:24 12:30


 


Temperature   


 


Pulse Rate 80 90 81


 


Respiratory   





Rate   


 


Blood Pressure   


 


Blood Pressure   





[Right]   


 


O2 Sat by Pulse 97 98 94





Oximetry   














  20





  12:35 12:40 12:45


 


Temperature   


 


Pulse Rate 86 79 79


 


Respiratory   





Rate   


 


Blood Pressure   


 


Blood Pressure   





[Right]   


 


O2 Sat by Pulse 97 96 95





Oximetry   














  20





  12:50 12:54 12:55


 


Temperature   


 


Pulse Rate 77 76 79


 


Respiratory   





Rate   


 


Blood Pressure   


 


Blood Pressure   





[Right]   


 


O2 Sat by Pulse 95 94 95





Oximetry   














  20





  13:00 13:01 13:05


 


Temperature   


 


Pulse Rate 71 74 74


 


Respiratory   





Rate   


 


Blood Pressure  126/74 


 


Blood Pressure   





[Right]   


 


O2 Sat by Pulse 96  96





Oximetry   














  20





  13:10 13:15 13:20


 


Temperature   


 


Pulse Rate 74 70 88


 


Respiratory   





Rate   


 


Blood Pressure   


 


Blood Pressure   





[Right]   


 


O2 Sat by Pulse 95 96 97





Oximetry   














  20





  13:25 13:30 13:32


 


Temperature   


 


Pulse Rate 78 74 78


 


Respiratory   





Rate   


 


Blood Pressure   


 


Blood Pressure   





[Right]   


 


O2 Sat by Pulse 96 97 94





Oximetry   














  20





  13:35 13:40 13:45


 


Temperature   


 


Pulse Rate 79 75 74


 


Respiratory   





Rate   


 


Blood Pressure   


 


Blood Pressure   





[Right]   


 


O2 Sat by Pulse 96 98 97





Oximetry   














  20





  13:50 13:55 14:00


 


Temperature   


 


Pulse Rate 74 86 74


 


Respiratory   





Rate   


 


Blood Pressure   


 


Blood Pressure   





[Right]   


 


O2 Sat by Pulse 98 97 97





Oximetry   














  20





  14:01 14:05 14:10


 


Temperature   


 


Pulse Rate 75 77 75


 


Respiratory   





Rate   


 


Blood Pressure 125/77  


 


Blood Pressure   





[Right]   


 


O2 Sat by Pulse  97 98





Oximetry   














  20





  14:15 14:20 14:22


 


Temperature   


 


Pulse Rate 72 75 107 H


 


Respiratory   





Rate   


 


Blood Pressure   


 


Blood Pressure   





[Right]   


 


O2 Sat by Pulse 97 96 86





Oximetry   














  20





  14:25 14:30 14:35


 


Temperature   


 


Pulse Rate 76 72 74


 


Respiratory   





Rate   


 


Blood Pressure   


 


Blood Pressure   





[Right]   


 


O2 Sat by Pulse 97 96 97





Oximetry   














  20





  14:39 14:40 14:45


 


Temperature   


 


Pulse Rate 25 L 86 75


 


Respiratory   





Rate   


 


Blood Pressure   


 


Blood Pressure   





[Right]   


 


O2 Sat by Pulse 85 98 97





Oximetry   














  20





  14:50 14:55 14:59


 


Temperature   


 


Pulse Rate 77 76 77


 


Respiratory   





Rate   


 


Blood Pressure   


 


Blood Pressure   





[Right]   


 


O2 Sat by Pulse 97 97 91





Oximetry   














  20





  15:00 15:01 15:05


 


Temperature   


 


Pulse Rate 74 66 73


 


Respiratory   





Rate   


 


Blood Pressure  151/88 


 


Blood Pressure   





[Right]   


 


O2 Sat by Pulse 97  93





Oximetry   














  20





  15:10 15:15 15:20


 


Temperature   


 


Pulse Rate 78 72 71


 


Respiratory   





Rate   


 


Blood Pressure   


 


Blood Pressure   





[Right]   


 


O2 Sat by Pulse 92 97 87





Oximetry   














  20





  15:25 15:30 15:35


 


Temperature   


 


Pulse Rate 79 77 73


 


Respiratory   





Rate   


 


Blood Pressure   


 


Blood Pressure   





[Right]   


 


O2 Sat by Pulse 96 98 98





Oximetry   














  20





  15:40 15:45 15:50


 


Temperature   


 


Pulse Rate 76 75 81


 


Respiratory   





Rate   


 


Blood Pressure   


 


Blood Pressure   





[Right]   


 


O2 Sat by Pulse 97 97 96





Oximetry   














  20





  15:59 16:01 16:04


 


Temperature   


 


Pulse Rate 81 71 81


 


Respiratory   





Rate   


 


Blood Pressure  140/78 


 


Blood Pressure   





[Right]   


 


O2 Sat by Pulse 96  96





Oximetry   














  20





  16:09 16:14 16:19


 


Temperature   


 


Pulse Rate 72 79 70


 


Respiratory   





Rate   


 


Blood Pressure   


 


Blood Pressure   





[Right]   


 


O2 Sat by Pulse 96 97 97





Oximetry   














  1120





  16:24 16:29 16:34


 


Temperature   


 


Pulse Rate 79 77 75


 


Respiratory   





Rate   


 


Blood Pressure   


 


Blood Pressure   





[Right]   


 


O2 Sat by Pulse 97 97 98





Oximetry   














  20





  16:39 16:44 16:49


 


Temperature   


 


Pulse Rate 85 80 89


 


Respiratory   





Rate   


 


Blood Pressure   


 


Blood Pressure   





[Right]   


 


O2 Sat by Pulse 98 97 98





Oximetry   














  20





  16:54 17:22 17:23


 


Temperature   


 


Pulse Rate 83 84 87


 


Respiratory   





Rate   


 


Blood Pressure   154/88


 


Blood Pressure   





[Right]   


 


O2 Sat by Pulse 96 98 





Oximetry   














  20





  17:27 17:32 17:37


 


Temperature   


 


Pulse Rate 100 H 80 89


 


Respiratory   





Rate   


 


Blood Pressure   


 


Blood Pressure   





[Right]   


 


O2 Sat by Pulse 97 96 99





Oximetry   














  20





  17:42 17:46 17:47


 


Temperature   


 


Pulse Rate 82 76 90


 


Respiratory   





Rate   


 


Blood Pressure   


 


Blood Pressure   





[Right]   


 


O2 Sat by Pulse 99 85 99





Oximetry   














  20





  17:52 17:57 18:00


 


Temperature   


 


Pulse Rate 85 84 83


 


Respiratory   





Rate   


 


Blood Pressure   


 


Blood Pressure   





[Right]   


 


O2 Sat by Pulse 97 97 94





Oximetry   














  20





  18:02 18:09 18:14


 


Temperature   


 


Pulse Rate 88 87 81


 


Respiratory   





Rate   


 


Blood Pressure 142/79  


 


Blood Pressure   





[Right]   


 


O2 Sat by Pulse 98 99 98





Oximetry   














  20





  18:19 18:24 18:29


 


Temperature   


 


Pulse Rate 81 81 84


 


Respiratory   





Rate   


 


Blood Pressure   


 


Blood Pressure   





[Right]   


 


O2 Sat by Pulse 98 100 100





Oximetry   














  20





  18:34 18:39 18:44


 


Temperature   


 


Pulse Rate 80 82 84


 


Respiratory   





Rate   


 


Blood Pressure   


 


Blood Pressure   





[Right]   


 


O2 Sat by Pulse 100 100 100





Oximetry   














  20





  18:49 18:54 18:59


 


Temperature   


 


Pulse Rate 86 88 90


 


Respiratory   





Rate   


 


Blood Pressure   


 


Blood Pressure   





[Right]   


 


O2 Sat by Pulse 100 100 100





Oximetry   














  20





  19:01 19:03 19:08


 


Temperature   


 


Pulse Rate 86 88 102 H


 


Respiratory   





Rate   


 


Blood Pressure 134/65  


 


Blood Pressure   





[Right]   


 


O2 Sat by Pulse  84 98





Oximetry   














  20





  19:13 19:18 19:23


 


Temperature   


 


Pulse Rate 98 H 92 H 116 H


 


Respiratory   





Rate   


 


Blood Pressure   


 


Blood Pressure   





[Right]   


 


O2 Sat by Pulse 98 100 98





Oximetry   














  20





  19:28 19:30 19:33


 


Temperature   


 


Pulse Rate 114 H 121 H 129 H


 


Respiratory   





Rate   


 


Blood Pressure  160/91 177/101


 


Blood Pressure   





[Right]   


 


O2 Sat by Pulse 99  99





Oximetry   














  20





  19:34 19:35 19:37


 


Temperature   


 


Pulse Rate 126 H 114 H 111 H


 


Respiratory   





Rate   


 


Blood Pressure 176/100 173/88 164/81


 


Blood Pressure   





[Right]   


 


O2 Sat by Pulse   





Oximetry   














  20





  19:38 19:40 19:43


 


Temperature   


 


Pulse Rate 106 H 98 H 98 H


 


Respiratory   





Rate   


 


Blood Pressure  163/81 133/76


 


Blood Pressure   





[Right]   


 


O2 Sat by Pulse 98  98





Oximetry   














  20





  19:45 19:47 19:48


 


Temperature   


 


Pulse Rate 100 H 96 H 93 H


 


Respiratory   





Rate   


 


Blood Pressure 134/79 118/68 113/65


 


Blood Pressure   





[Right]   


 


O2 Sat by Pulse   98





Oximetry   














  20





  19:51 19:53 19:55


 


Temperature   


 


Pulse Rate 86 82 85


 


Respiratory   





Rate   


 


Blood Pressure 121/71 105/62 99/59


 


Blood Pressure   





[Right]   


 


O2 Sat by Pulse  98 





Oximetry   














  20





  19:57 19:58 19:59


 


Temperature   


 


Pulse Rate 80 99 H 97 H


 


Respiratory   





Rate   


 


Blood Pressure 109/58  94/55


 


Blood Pressure   





[Right]   


 


O2 Sat by Pulse  100 





Oximetry   














  20





  20:03 20:05 20:07


 


Temperature   


 


Pulse Rate 111 H 84 93 H


 


Respiratory   





Rate   


 


Blood Pressure 106/56 118/58 109/55


 


Blood Pressure   





[Right]   


 


O2 Sat by Pulse 100  





Oximetry   














  20





  20:08 20:09 20:10


 


Temperature   


 


Pulse Rate 96 H 96 H 82


 


Respiratory   





Rate   


 


Blood Pressure  109/59 119/60


 


Blood Pressure   





[Right]   


 


O2 Sat by Pulse 100  





Oximetry   














  20





  20:13 20:15 20:18


 


Temperature   


 


Pulse Rate 97 H 88 86


 


Respiratory   





Rate   


 


Blood Pressure 104/55 115/64 


 


Blood Pressure   





[Right]   


 


O2 Sat by Pulse 100  99





Oximetry   














  20





  20:21 20:23 20:25


 


Temperature   


 


Pulse Rate 98 H 103 H 109 H


 


Respiratory   





Rate   


 


Blood Pressure 123/73 112/66 125/65


 


Blood Pressure   





[Right]   


 


O2 Sat by Pulse  99 





Oximetry   














  20





  20:28 20:29 20:31


 


Temperature   


 


Pulse Rate 102 H 89 93 H


 


Respiratory   





Rate   


 


Blood Pressure  125/61 110/58


 


Blood Pressure   





[Right]   


 


O2 Sat by Pulse 100  





Oximetry   














  20





  20:33 20:38 20:43


 


Temperature   


 


Pulse Rate 102 H 101 H 92 H


 


Respiratory   





Rate   


 


Blood Pressure   


 


Blood Pressure   





[Right]   


 


O2 Sat by Pulse 100 100 100





Oximetry   














  20





  20:45 20:48 20:53


 


Temperature   


 


Pulse Rate 85 97 H 93 H


 


Respiratory   





Rate   


 


Blood Pressure   


 


Blood Pressure   





[Right]   


 


O2 Sat by Pulse 91 99 98





Oximetry   














  20





  20:56 20:58 21:03


 


Temperature   


 


Pulse Rate 98 H 96 H 106 H


 


Respiratory   





Rate   


 


Blood Pressure   115/72


 


Blood Pressure   





[Right]   


 


O2 Sat by Pulse 91 94 100





Oximetry   














  20





  21:08 21:13 21:17


 


Temperature   


 


Pulse Rate 101 H 113 H 106 H


 


Respiratory   





Rate   


 


Blood Pressure   106/53


 


Blood Pressure   





[Right]   


 


O2 Sat by Pulse 100 100 





Oximetry   














  20





  21:18 21:19 21:23


 


Temperature   


 


Pulse Rate 96 H 95 H 101 H


 


Respiratory   





Rate   


 


Blood Pressure   


 


Blood Pressure   





[Right]   


 


O2 Sat by Pulse 95 87 98





Oximetry   














- Exam


Breasts: deferred


Abdomen: Present: normal appearance, soft


Vulva: both: normal


Uterus: Present: normal


FHR: category 2


FHR comments: 





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 


Uterine Contraction Monitor Mode: Internal


Cervical Dilatation: 5


Cervical Effacement Percentage: 90


Fetal station: -2


Uterine Contraction Frequency (min): q3-5


Uterine Contraction Pattern: Regular


Uterine Tone Measurement Phase: Resting


Uterine Contraction Intensity: Strong/Firm


Extremities: normal





- Labs


Labs: 


                                  Abnormal Labs











  20





  10:30 10:30


 


RBC  3.12 L 


 


Hct  30.1 L 


 


MCH  34 H 


 


MCHC  35 H 


 


Sodium   134 L


 


BUN   6 L


 


Alkaline Phosphatase   134 H


 


Albumin   3.2 L








                         Laboratory Results - last 24 hr











  20





  08:00 10:30 10:30


 


WBC   8.4 


 


RBC   3.12 L 


 


Hgb   10.4 


 


Hct   30.1 L 


 


MCV   96 


 


MCH   34 H 


 


MCHC   35 H 


 


RDW   13.9 


 


Plt Count   281 


 


Sodium    134 L


 


Potassium    3.6


 


Chloride    101.0


 


Carbon Dioxide    22


 


Anion Gap    15


 


BUN    6 L


 


Creatinine    0.6


 


Estimated GFR    > 60


 


BUN/Creatinine Ratio    10


 


Glucose    95


 


Uric Acid    5.1


 


Calcium    8.8


 


Total Bilirubin    0.20


 


AST    12


 


ALT    7


 


Alkaline Phosphatase    134 H


 


Total Protein    6.6


 


Albumin    3.2 L


 


Albumin/Globulin Ratio    0.9


 


Blood Type  O POSITIVE  


 


Antibody Screen  Negative
Assessment and Plan





A: IUP@ term


    GDM





p: IFM placed and Pitocin turned off


    Dr Orellana in to review FHT


    Will cont Amnio and restart Pitocin latter


    Cont monitoring


    Anticipate 


  





- Patient Problems


(1) Term pregnancy


Current Visit: Yes   Status: Acute   





(2) AMA (advanced maternal age) multigravida 35+


Current Visit: Yes   Status: Acute   





(3) Fetal microcephaly


Current Visit: Yes   Status: Acute   





(4) GDM (gestational diabetes mellitus)


Current Visit: Yes   Status: Acute   





(5) Abnormal Pap smear of cervix


Current Visit: Yes   Status: Acute   





Subjective





- Subjective


Date of service: 20


Principal diagnosis: IUP@ TERM


Interval history: 





41 y/o   female presents to [facility] @ 40.1 wks with SROM cl fluid @ 5am

today. She initiated her pnc @ [facility] at 26.6 wks. Her preg has been 

complicated by late care, suspected fetal microcephaly, and GDM (diet con

trolled). She is AMA, rubella NI, pap with LSIL and GBS neg. Pt was admitted to 

L&D for .


Patient reports: fetal movement normal





Objective





- Vital Signs


Vital Signs: 


                               Vital Signs - 12hr











  20





  11:19 11:20 11:24


 


Temperature  98.2 F 


 


Pulse Rate 86 86 79


 


Respiratory  16 





Rate   


 


Blood Pressure 119/75  


 


Blood Pressure  119/75 





[Right]   


 


O2 Sat by Pulse 96 98 97





Oximetry   














  20





  11:29 11:34 11:39


 


Temperature   


 


Pulse Rate 81 86 77


 


Respiratory   





Rate   


 


Blood Pressure   


 


Blood Pressure   





[Right]   


 


O2 Sat by Pulse 97 98 96





Oximetry   














  20





  11:44 11:49 11:54


 


Temperature   


 


Pulse Rate 81 75 79


 


Respiratory   





Rate   


 


Blood Pressure   


 


Blood Pressure   





[Right]   


 


O2 Sat by Pulse 97 97 96





Oximetry   














  20





  11:59 12:01 12:04


 


Temperature   


 


Pulse Rate 83 81 79


 


Respiratory   





Rate   


 


Blood Pressure  134/82 


 


Blood Pressure   





[Right]   


 


O2 Sat by Pulse 98  97





Oximetry   














  20





  12:09 12:14 12:19


 


Temperature   


 


Pulse Rate 83 76 80


 


Respiratory   





Rate   


 


Blood Pressure   


 


Blood Pressure   





[Right]   


 


O2 Sat by Pulse 98 97 97





Oximetry   














  20





  12:24 12:30 12:35


 


Temperature   


 


Pulse Rate 90 81 86


 


Respiratory   





Rate   


 


Blood Pressure   


 


Blood Pressure   





[Right]   


 


O2 Sat by Pulse 98 94 97





Oximetry   














  20





  12:40 12:45 12:50


 


Temperature   


 


Pulse Rate 79 79 77


 


Respiratory   





Rate   


 


Blood Pressure   


 


Blood Pressure   





[Right]   


 


O2 Sat by Pulse 96 95 95





Oximetry   














  20





  12:54 12:55 13:00


 


Temperature   


 


Pulse Rate 76 79 71


 


Respiratory   





Rate   


 


Blood Pressure   


 


Blood Pressure   





[Right]   


 


O2 Sat by Pulse 94 95 96





Oximetry   














  20





  13:01 13:05 13:10


 


Temperature   


 


Pulse Rate 74 74 74


 


Respiratory   





Rate   


 


Blood Pressure 126/74  


 


Blood Pressure   





[Right]   


 


O2 Sat by Pulse  96 95





Oximetry   














  20





  13:15 13:20 13:25


 


Temperature   


 


Pulse Rate 70 88 78


 


Respiratory   





Rate   


 


Blood Pressure   


 


Blood Pressure   





[Right]   


 


O2 Sat by Pulse 96 97 96





Oximetry   














  20





  13:30 13:32 13:35


 


Temperature   


 


Pulse Rate 74 78 79


 


Respiratory   





Rate   


 


Blood Pressure   


 


Blood Pressure   





[Right]   


 


O2 Sat by Pulse 97 94 96





Oximetry   














  20





  13:40 13:45 13:50


 


Temperature   


 


Pulse Rate 75 74 74


 


Respiratory   





Rate   


 


Blood Pressure   


 


Blood Pressure   





[Right]   


 


O2 Sat by Pulse 98 97 98





Oximetry   














  20





  13:55 14:00 14:01


 


Temperature   


 


Pulse Rate 86 74 75


 


Respiratory   





Rate   


 


Blood Pressure   125/77


 


Blood Pressure   





[Right]   


 


O2 Sat by Pulse 97 97 





Oximetry   














  20





  14:05 14:10 14:15


 


Temperature   


 


Pulse Rate 77 75 72


 


Respiratory   





Rate   


 


Blood Pressure   


 


Blood Pressure   





[Right]   


 


O2 Sat by Pulse 97 98 97





Oximetry   














  20





  14:20 14:22 14:25


 


Temperature   


 


Pulse Rate 75 107 H 76


 


Respiratory   





Rate   


 


Blood Pressure   


 


Blood Pressure   





[Right]   


 


O2 Sat by Pulse 96 86 97





Oximetry   














  20





  14:30 14:35 14:39


 


Temperature   


 


Pulse Rate 72 74 25 L


 


Respiratory   





Rate   


 


Blood Pressure   


 


Blood Pressure   





[Right]   


 


O2 Sat by Pulse 96 97 85





Oximetry   














  20





  14:40 14:45 14:50


 


Temperature   


 


Pulse Rate 86 75 77


 


Respiratory   





Rate   


 


Blood Pressure   


 


Blood Pressure   





[Right]   


 


O2 Sat by Pulse 98 97 97





Oximetry   














  20





  14:55 14:59 15:00


 


Temperature   


 


Pulse Rate 76 77 74


 


Respiratory   





Rate   


 


Blood Pressure   


 


Blood Pressure   





[Right]   


 


O2 Sat by Pulse 97 91 97





Oximetry   














  20





  15:01 15:05 15:10


 


Temperature   


 


Pulse Rate 66 73 78


 


Respiratory   





Rate   


 


Blood Pressure 151/88  


 


Blood Pressure   





[Right]   


 


O2 Sat by Pulse  93 92





Oximetry   














  20





  15:15 15:20 15:25


 


Temperature   


 


Pulse Rate 72 71 79


 


Respiratory   





Rate   


 


Blood Pressure   


 


Blood Pressure   





[Right]   


 


O2 Sat by Pulse 97 87 96





Oximetry   














  20





  15:30 15:35 15:40


 


Temperature   


 


Pulse Rate 77 73 76


 


Respiratory   





Rate   


 


Blood Pressure   


 


Blood Pressure   





[Right]   


 


O2 Sat by Pulse 98 98 97





Oximetry   














  20





  15:45 15:50 15:59


 


Temperature   


 


Pulse Rate 75 81 81


 


Respiratory   





Rate   


 


Blood Pressure   


 


Blood Pressure   





[Right]   


 


O2 Sat by Pulse 97 96 96





Oximetry   














  20





  16:01 16:04 16:09


 


Temperature   


 


Pulse Rate 71 81 72


 


Respiratory   





Rate   


 


Blood Pressure 140/78  


 


Blood Pressure   





[Right]   


 


O2 Sat by Pulse  96 96





Oximetry   














  20





  16:14 16:19 16:24


 


Temperature   


 


Pulse Rate 79 70 79


 


Respiratory   





Rate   


 


Blood Pressure   


 


Blood Pressure   





[Right]   


 


O2 Sat by Pulse 97 97 97





Oximetry   














  20





  16:29 16:34 16:39


 


Temperature   


 


Pulse Rate 77 75 85


 


Respiratory   





Rate   


 


Blood Pressure   


 


Blood Pressure   





[Right]   


 


O2 Sat by Pulse 97 98 98





Oximetry   














  20





  16:44 16:49 16:54


 


Temperature   


 


Pulse Rate 80 89 83


 


Respiratory   





Rate   


 


Blood Pressure   


 


Blood Pressure   





[Right]   


 


O2 Sat by Pulse 97 98 96





Oximetry   














  20





  17:22 17:23 17:27


 


Temperature   


 


Pulse Rate 84 87 100 H


 


Respiratory   





Rate   


 


Blood Pressure  154/88 


 


Blood Pressure   





[Right]   


 


O2 Sat by Pulse 98  97





Oximetry   














  20





  17:32 17:37 17:42


 


Temperature   


 


Pulse Rate 80 89 82


 


Respiratory   





Rate   


 


Blood Pressure   


 


Blood Pressure   





[Right]   


 


O2 Sat by Pulse 96 99 99





Oximetry   














  20





  17:46 17:47 17:52


 


Temperature   


 


Pulse Rate 76 90 85


 


Respiratory   





Rate   


 


Blood Pressure   


 


Blood Pressure   





[Right]   


 


O2 Sat by Pulse 85 99 97





Oximetry   














  20





  17:57 18:00 18:02


 


Temperature   


 


Pulse Rate 84 83 88


 


Respiratory   





Rate   


 


Blood Pressure   142/79


 


Blood Pressure   





[Right]   


 


O2 Sat by Pulse 97 94 98





Oximetry   














  20





  18:09 18:14 18:19


 


Temperature   


 


Pulse Rate 87 81 81


 


Respiratory   





Rate   


 


Blood Pressure   


 


Blood Pressure   





[Right]   


 


O2 Sat by Pulse 99 98 98





Oximetry   














  20





  18:24 18:29 18:34


 


Temperature   


 


Pulse Rate 81 84 80


 


Respiratory   





Rate   


 


Blood Pressure   


 


Blood Pressure   





[Right]   


 


O2 Sat by Pulse 100 100 100





Oximetry   














  20





  18:39 18:44 18:49


 


Temperature   


 


Pulse Rate 82 84 86


 


Respiratory   





Rate   


 


Blood Pressure   


 


Blood Pressure   





[Right]   


 


O2 Sat by Pulse 100 100 100





Oximetry   














  20





  18:54 18:59 19:01


 


Temperature   


 


Pulse Rate 88 90 86


 


Respiratory   





Rate   


 


Blood Pressure   134/65


 


Blood Pressure   





[Right]   


 


O2 Sat by Pulse 100 100 





Oximetry   














  20





  19:03 19:08 19:13


 


Temperature   


 


Pulse Rate 88 102 H 98 H


 


Respiratory   





Rate   


 


Blood Pressure   


 


Blood Pressure   





[Right]   


 


O2 Sat by Pulse 84 98 98





Oximetry   














  20





  19:18 19:23 19:28


 


Temperature   


 


Pulse Rate 92 H 116 H 114 H


 


Respiratory   





Rate   


 


Blood Pressure   


 


Blood Pressure   





[Right]   


 


O2 Sat by Pulse 100 98 99





Oximetry   














  20





  19:30 19:33 19:34


 


Temperature   


 


Pulse Rate 121 H 129 H 126 H


 


Respiratory   





Rate   


 


Blood Pressure 160/91 177/101 176/100


 


Blood Pressure   





[Right]   


 


O2 Sat by Pulse  99 





Oximetry   














  20





  19:35 19:37 19:38


 


Temperature   


 


Pulse Rate 114 H 111 H 106 H


 


Respiratory   





Rate   


 


Blood Pressure 173/88 164/81 


 


Blood Pressure   





[Right]   


 


O2 Sat by Pulse   98





Oximetry   














  20





  19:40 19:43 19:45


 


Temperature   


 


Pulse Rate 98 H 98 H 100 H


 


Respiratory   





Rate   


 


Blood Pressure 163/81 133/76 134/79


 


Blood Pressure   





[Right]   


 


O2 Sat by Pulse  98 





Oximetry   














  20





  19:47 19:48 19:51


 


Temperature   


 


Pulse Rate 96 H 93 H 86


 


Respiratory   





Rate   


 


Blood Pressure 118/68 113/65 121/71


 


Blood Pressure   





[Right]   


 


O2 Sat by Pulse  98 





Oximetry   














  20





  19:53 19:55 19:57


 


Temperature   


 


Pulse Rate 82 85 80


 


Respiratory   





Rate   


 


Blood Pressure 105/62 99/59 109/58


 


Blood Pressure   





[Right]   


 


O2 Sat by Pulse 98  





Oximetry   














  20





  19:58 19:59 20:03


 


Temperature   


 


Pulse Rate 99 H 97 H 111 H


 


Respiratory   





Rate   


 


Blood Pressure  94/55 106/56


 


Blood Pressure   





[Right]   


 


O2 Sat by Pulse 100  100





Oximetry   














  20





  20:05 20:07 20:08


 


Temperature   


 


Pulse Rate 84 93 H 96 H


 


Respiratory   





Rate   


 


Blood Pressure 118/58 109/55 


 


Blood Pressure   





[Right]   


 


O2 Sat by Pulse   100





Oximetry   














  20





  20:09 20:10 20:13


 


Temperature   


 


Pulse Rate 96 H 82 97 H


 


Respiratory   





Rate   


 


Blood Pressure 109/59 119/60 104/55


 


Blood Pressure   





[Right]   


 


O2 Sat by Pulse   100





Oximetry   














  20





  20:15 20:18 20:21


 


Temperature   


 


Pulse Rate 88 86 98 H


 


Respiratory   





Rate   


 


Blood Pressure 115/64  123/73


 


Blood Pressure   





[Right]   


 


O2 Sat by Pulse  99 





Oximetry   














  20





  20:23 20:25 20:28


 


Temperature   


 


Pulse Rate 103 H 109 H 102 H


 


Respiratory   





Rate   


 


Blood Pressure 112/66 125/65 


 


Blood Pressure   





[Right]   


 


O2 Sat by Pulse 99  100





Oximetry   














  20





  20:29 20:31 20:33


 


Temperature   


 


Pulse Rate 89 93 H 102 H


 


Respiratory   





Rate   


 


Blood Pressure 125/61 110/58 


 


Blood Pressure   





[Right]   


 


O2 Sat by Pulse   100





Oximetry   














  20





  20:38 20:43 20:45


 


Temperature   


 


Pulse Rate 101 H 92 H 85


 


Respiratory   





Rate   


 


Blood Pressure   


 


Blood Pressure   





[Right]   


 


O2 Sat by Pulse 100 100 91





Oximetry   














  20





  20:48 20:53 20:56


 


Temperature   


 


Pulse Rate 97 H 93 H 98 H


 


Respiratory   





Rate   


 


Blood Pressure   


 


Blood Pressure   





[Right]   


 


O2 Sat by Pulse 99 98 91





Oximetry   














  20





  20:58 21:03 21:08


 


Temperature   


 


Pulse Rate 96 H 106 H 101 H


 


Respiratory   





Rate   


 


Blood Pressure  115/72 


 


Blood Pressure   





[Right]   


 


O2 Sat by Pulse 94 100 100





Oximetry   














  20





  21:13 21:17 21:18


 


Temperature   


 


Pulse Rate 113 H 106 H 96 H


 


Respiratory   





Rate   


 


Blood Pressure  106/53 


 


Blood Pressure   





[Right]   


 


O2 Sat by Pulse 100  95





Oximetry   














  20





  21:19 21:23 21:28


 


Temperature   


 


Pulse Rate 95 H 101 H 113 H


 


Respiratory   





Rate   


 


Blood Pressure   


 


Blood Pressure   





[Right]   


 


O2 Sat by Pulse 87 98 99





Oximetry   














  20





  21:33 21:34 21:38


 


Temperature   


 


Pulse Rate 83 87 74


 


Respiratory   





Rate   


 


Blood Pressure  107/55 


 


Blood Pressure   





[Right]   


 


O2 Sat by Pulse 100  100





Oximetry   














  20





  21:43 21:47 21:48


 


Temperature   


 


Pulse Rate 91 H 96 H 101 H


 


Respiratory   





Rate   


 


Blood Pressure  120/72 


 


Blood Pressure   





[Right]   


 


O2 Sat by Pulse 99  98





Oximetry   














  20





  21:53 21:58 22:02


 


Temperature   


 


Pulse Rate 87 105 H 78


 


Respiratory   





Rate   


 


Blood Pressure   130/66


 


Blood Pressure   





[Right]   


 


O2 Sat by Pulse 100 100 





Oximetry   














  20





  22:03 22:08 22:11


 


Temperature   


 


Pulse Rate 80 82 95 H


 


Respiratory   





Rate   


 


Blood Pressure   


 


Blood Pressure   





[Right]   


 


O2 Sat by Pulse 100 100 90





Oximetry   














  20





  22:13 22:18 22:23


 


Temperature   


 


Pulse Rate 78 85 85


 


Respiratory   





Rate   


 


Blood Pressure  114/58 


 


Blood Pressure   





[Right]   


 


O2 Sat by Pulse 99 99 99





Oximetry   














  20





  22:28 22:29 22:33


 


Temperature   


 


Pulse Rate 99 H 70 89


 


Respiratory   





Rate   


 


Blood Pressure   


 


Blood Pressure   





[Right]   


 


O2 Sat by Pulse 98 68 L 99





Oximetry   














  20





  22:38 22:40 22:43


 


Temperature   


 


Pulse Rate 94 H 81 83


 


Respiratory   





Rate   


 


Blood Pressure  107/54 


 


Blood Pressure   





[Right]   


 


O2 Sat by Pulse 86  100





Oximetry   














  20





  22:45 22:48 22:53


 


Temperature   


 


Pulse Rate 86 90 90


 


Respiratory   





Rate   


 


Blood Pressure   


 


Blood Pressure   





[Right]   


 


O2 Sat by Pulse 88 100 100





Oximetry   














- Exam


Breasts: deferred


Abdomen: Present: normal appearance, soft


Vulva: both: normal


Uterus: Present: normal


FHR: category 2


FHR comments: 





FHR with mod stan and occ variable


Uterine Contraction Monitor Mode: Internal


Cervical Dilatation: 5


Cervical Effacement Percentage: 90


Fetal station: -2


Uterine Contraction Frequency (min): q3-4


Uterine Contraction Pattern: Regular


Uterine Tone Measurement Phase: Resting


Uterine Contraction Intensity: Strong/Firm


Extremities: normal





- Labs


Labs: 


                                  Abnormal Labs











  20





  10:30 10:30


 


RBC  3.12 L 


 


Hct  30.1 L 


 


MCH  34 H 


 


MCHC  35 H 


 


Sodium   134 L


 


BUN   6 L


 


Alkaline Phosphatase   134 H


 


Albumin   3.2 L








                         Laboratory Results - last 24 hr











  20





  08:00 10:30 10:30


 


WBC   8.4 


 


RBC   3.12 L 


 


Hgb   10.4 


 


Hct   30.1 L 


 


MCV   96 


 


MCH   34 H 


 


MCHC   35 H 


 


RDW   13.9 


 


Plt Count   281 


 


Sodium    134 L


 


Potassium    3.6


 


Chloride    101.0


 


Carbon Dioxide    22


 


Anion Gap    15


 


BUN    6 L


 


Creatinine    0.6


 


Estimated GFR    > 60


 


BUN/Creatinine Ratio    10


 


Glucose    95


 


POC Glucose   


 


Uric Acid    5.1


 


Calcium    8.8


 


Total Bilirubin    0.20


 


AST    12


 


ALT    7


 


Alkaline Phosphatase    134 H


 


Total Protein    6.6


 


Albumin    3.2 L


 


Albumin/Globulin Ratio    0.9


 


Blood Type  O POSITIVE  


 


Antibody Screen  Negative  














  20





  21:37


 


WBC 


 


RBC 


 


Hgb 


 


Hct 


 


MCV 


 


MCH 


 


MCHC 


 


RDW 


 


Plt Count 


 


Sodium 


 


Potassium 


 


Chloride 


 


Carbon Dioxide 


 


Anion Gap 


 


BUN 


 


Creatinine 


 


Estimated GFR 


 


BUN/Creatinine Ratio 


 


Glucose 


 


POC Glucose  90


 


Uric Acid 


 


Calcium 


 


Total Bilirubin 


 


AST 


 


ALT 


 


Alkaline Phosphatase 


 


Total Protein 


 


Albumin 


 


Albumin/Globulin Ratio 


 


Blood Type 


 


Antibody Screen
Assessment and Plan


A: Postpartum Day #3


     GDM


     Intermittently elevated blood pressure; Asymptomatic





P: Follow routine postpartum orders


   Return to office in 2 weeks for BP recheck


   Return to office in 8 weeks for 2-hr GTT


   D/C home today per pt request


    





Subjective





- Subjective


Date of service: 20


Principal diagnosis: Postpartum day 3 S/P 


Patient reports: appetite normal, voiding normally, pain well controlled, bowel 

movement, ambulating normally, other (Denies any headaches, visual changes, N&V,

abd pain)


: doing well, bottle feeding (and breastfeeding)





Objective





- Vital Signs


Latest vital signs: 


                                   Vital Signs











  Temp Pulse Resp BP Pulse Ox


 


 20 07:41  98.2 F  79  18  134/87  97


 


 20 06:25    18  


 


 20 04:34    18  


 


 20 00:18  98.6 F  88  18  133/90  95


 


 11/15/20 15:39  98.7 F  89  18  130/62  96








                                Intake and Output











 11/15/20 11/16/20 11/16/20





 22:59 06:59 14:59


 


Intake Total 1080 180 240


 


Output Total  200 


 


Balance 1080 -20 240


 


Intake:   


 


  Oral 480 180 240


 


  Intake, Free Water 600  


 


Output:   


 


  Urine  200 


 


    Void  200 


 


Other:   


 


  Total, Intake Amount 480 180 240


 


  Total, Output Amount  200 


 


  # Voids   


 


    Void 3 1 1














- Exam


Breasts: Present: normal


Cardiovascular: Present: Regular rate


Lungs: Present: Clear to auscultation, Normal air movement


Abdomen: Present: normal appearance, soft, normal bowel sounds


Uterus: Present: normal, firm, fundal height below umbilicus


Extremities: Present: normal





- Labs


Labs: 


                              Abnormal lab results











  11/15/20 11/15/20 11/15/20 Range/Units





  13:42 13:42 18:00 


 


RBC  3.16 L    (3.65-5.03)  M/mm3


 


MCH  34 H    (28-32)  pg


 


MCHC  36 H    (30-34)  %


 


Lymph % (Auto)  6.2 L    (13.4-35.0)  %


 


Lymph # (Auto)  0.6 L    (1.2-5.4)  K/mm3


 


Seg Neutrophils %  87.9 H    (40.0-70.0)  %


 


Seg Neutrophils #  9.2 H    (1.8-7.7)  K/mm3


 


Sodium   136 L   (137-145)  mmol/L


 


Glucose   108 H   ()  mg/dL


 


Lactate Dehydrogenase   191 H   ()  units/L


 


Albumin   3.1 L   (3.9-5)  g/dL


 


Urine WBC (Auto)    > 182.0 H  (0.0-6.0)  /HPF
Assessment and Plan


A: Postpartum day 1 S/P . 


Anemia.


Heart murmur.


P: Continue to supplement oral iron. 


Anticipate discharge home tomorrow if patient continues to do well.


Consulted with Dr. Lemus re: patient's heart murmur; she states ok for patient

to have heart murmur evaluated as an outpatient after hospital discharge. 








Subjective





- Subjective


Date of service: 20


Principal diagnosis: Postpartum day 1 S/P 


Interval history: 


Patient denies shortness of breath, chest pain, fatigue, dizziness, or syncope.





Patient reports: appetite normal, voiding normally, pain well controlled, 

flatus, ambulating normally, no dizzy ambulation, no nauseated


[place]: doing well





Objective





- Vital Signs


Latest vital signs: 


                                   Vital Signs











  Temp Pulse Resp BP Pulse Ox


 


 20 08:16  97.9 F  82  18  139/88  96


 


 20 01:36    16  


 


 20 01:21  98.0 F  85  20  119/75  99








                                Intake and Output











 20





 23:59 07:59 15:59


 


Intake Total  480 240


 


Balance  480 240


 


Intake:   


 


  Oral  480 240


 


Other:   


 


  Total, Intake Amount  240 240


 


  # Voids   


 


    Void 1 1 














- Exam


Cardiovascular: Present: Regular rate, Other (murmur heard)


Lungs: Present: Clear to auscultation


Abdomen: Present: normal appearance, soft, normal bowel sounds.  Absent: 

distention, tenderness, guarding, rigidity


Uterus: Present: normal, firm, fundal height below umbilicus.  Absent: 

bogginess, tenderness


Extremities: Present: normal.  Absent: tenderness, edema





- Labs


Labs: 


                              Abnormal lab results











  20 Range/Units





  14:45 


 


Hgb  9.5 L  (10.1-14.3)  gm/dl


 


Hct  27.3 L  (30.3-42.9)  %
Assessment and Plan


A: Postpartum day 2 S/P . 


Anemia. 


Heart murmur. 


Intermittently elevated blood pressure. 


P: Continue iron supplementation. 


Monitor BP. 


Preeclamptic labs. 


Plan is to evaluate heart murmur as an outpatient after hospital discharge. 








Subjective





- Subjective


Date of service: 11/15/20


Principal diagnosis: Postpartum day 2 S/P 


Interval history: 


Patient denies headache, visual disturbance, nausea or vomiting, shortness of 

breath, chest pain, fatigue, dizziness, or syncope.


Several elevated blood pressures noted. 


Preeclamptic labs ordered.








Patient reports: appetite normal, voiding normally, pain well controlled, 

flatus, ambulating normally, nauseated, no dizzy ambulation


[place]: doing well





Objective





- Vital Signs


Latest vital signs: 


                                   Vital Signs











  Temp Pulse Resp BP Pulse Ox


 


 11/15/20 15:39  98.7 F  89  18  130/62  96


 


 11/15/20 07:45  97.9 F  70  18  124/71  97


 


 11/15/20 00:31  98.2 F  83  18  131/85  93








                                Intake and Output











 11/15/20 11/15/20 11/15/20





 07:59 15:59 23:59


 


Intake Total 


 


Balance 


 


Intake:   


 


  Oral 480 240 480


 


  Intake, Free Water   600


 


Other:   


 


  Total, Intake Amount 240 240 480


 


  # Voids   


 


    Void 1 1 3














- Exam


Cardiovascular: Present: Regular rate, Other (murmur heard)


Lungs: Present: Clear to auscultation


Abdomen: Present: normal appearance, soft, normal bowel sounds.  Absent: distent

ion, tenderness, guarding, rigidity


Uterus: Present: normal, firm, fundal height below umbilicus.  Absent: 

bogginess, tenderness


Extremities: Present: normal.  Absent: tenderness, edema





- Labs


Labs: 


                              Abnormal lab results











  11/15/20 11/15/20 11/15/20 Range/Units





  13:42 13:42 18:00 


 


RBC  3.16 L    (3.65-5.03)  M/mm3


 


MCH  34 H    (28-32)  pg


 


MCHC  36 H    (30-34)  %


 


Lymph % (Auto)  6.2 L    (13.4-35.0)  %


 


Lymph # (Auto)  0.6 L    (1.2-5.4)  K/mm3


 


Seg Neutrophils %  87.9 H    (40.0-70.0)  %


 


Seg Neutrophils #  9.2 H    (1.8-7.7)  K/mm3


 


Sodium   136 L   (137-145)  mmol/L


 


Glucose   108 H   ()  mg/dL


 


Lactate Dehydrogenase   191 H   ()  units/L


 


Albumin   3.1 L   (3.9-5)  g/dL


 


Urine WBC (Auto)    > 182.0 H  (0.0-6.0)  /HPF
Labor Epidural





- Labor Epidural


Start Time: 19:35


Stop Time: 19:45


Performed by:: GUS LORENZO


Procedure: 


Patient is requesting epidural for labor pain.  H&P, and labs reviewed.  

Procedure explained, questions answered, consent obtained.  Patient in sitting 

position with blood pressure cuff and pulse ox on and working. Timeout performed

immediately before start of procedure.  Sterile betadine prep/drape. 3 mL 1% 

lidocaine skin wheal at L[3]-L[4].  18-gauge Ektron epidural needle advanced to

 loss-of-resistance with saline at [7] cm.  Epidural dexmedetomidine [30] mcg 

administered. Epidural catheter advanced to [12] cm, negative aspiration for 

blood and csf, negative test dose 3 ml 1.5% lidocaine with epinephrine. Sterile 

steri-strips and tegaderm applied, followed by tape reinforcement. Patient 

tolerated procedure well. Fritsch SRNA
Laceration to right hand cleaned and laceration repaired and Pt tolerated procedure well. Dressing placed and Pt will F/U with Pediatrician or Urgent care center for suture removal in 7-10 days.  Pt D/C in stable condition and F/U as discussed.

## 2020-11-16 NOTE — DISCHARGE SUMMARY
Providers





- Providers


Date of Admission: 


20 07:21





Date of discharge: 20


Attending physician: 


EVERETT CAMACHO MD





Primary care physician: 


PRIMARY CARE MD








Hospitalization


Reason for admission: rupture of membranes, IUP at term


Delivery: 


Episiotomy: none


Laceration: 1st degree


Other postpartum procedures: none


Postpartum complications: none


Discharge diagnosis: IUP at term delivered


Daphne baby: female


Condition at discharge: Good


Disposition: DC-01 TO HOME OR SELFCARE





Plan





- Provider Discharge Summary


Additional instructions: 


[]  Smoking cessation referral if applicable(refer to patient education folder 

for contact #)


[]  Refer to [facility]'s [facility] Booklet








Call your doctor immediately for:


* Fever > 100.5


* Heavy vaginal bleeding ( >1 pad per hour)


* Severe persistent headache


* Shortness of breath


* Reddened, hot, painful area to leg or breast


* Drainage or odor from incision.





* Keep incision clean and dry at all times and follow doctor's instructions 

regarding bathing/showering











- Follow up plan


Follow up: 


PRIMARY CARE,MD [Primary Care Provider] - 14 Days


Forms:  [facility] Discharge Summary

## 2022-04-29 ENCOUNTER — HOSPITAL ENCOUNTER (EMERGENCY)
Dept: HOSPITAL 5 - ED | Age: 42
Discharge: LEFT BEFORE DISCHARGE | End: 2022-04-29
Payer: MEDICAID

## 2022-04-29 DIAGNOSIS — R51.9: Primary | ICD-10-CM

## 2022-04-29 DIAGNOSIS — Z53.21: ICD-10-CM
